# Patient Record
(demographics unavailable — no encounter records)

---

## 2018-01-02 NOTE — PDOC
History of Present Illness





- General


Chief Complaint: Allergic Reaction


Stated Complaint: ALLERGIC REACTION/FEVER


Time Seen by Provider: 01/02/18 01:20





Past History





- Past History


Allergies/Adverse Reactions: 


Allergies





No Known Allergies Allergy (Verified 01/02/18 01:16)


 








Home Medications: 


Ambulatory Orders





Amoxicillin Suspension - 600 mg PO BID #105 ml 01/02/18 











- Social History


Smoking Status: Never smoked





*Physical Exam





- Vital Signs


 Last Vital Signs











Temp Pulse Resp BP Pulse Ox


 


 100.8 F H  109   20   98/67   97 


 


 01/02/18 01:16  01/02/18 01:16  01/02/18 01:16  01/02/18 01:16  01/02/18 01:16














*DC/Admit/Observation/Transfer


Diagnosis at time of Disposition: 


Otitis media


Qualifiers:


 Otitis media type: unspecified Laterality: bilateral Qualified Code(s): H66.93 

- Otitis media, unspecified, bilateral








- Discharge Dispostion


Disposition: HOME


Condition at time of disposition: Good


Admit: No





- Referrals


Referrals: 


John Max MD [Primary Care Provider] - 





- Patient Instructions


Printed Discharge Instructions:  DI for Otitis Media (Middle Ear Infection)-

Child


Additional Instructions: 


Cristobal has an ear infection. He was prescribed amoxicillin. Please take this 

medication twice a day for one week. Please finish all of the medication even 

if he feels better. He may have Tylenol or Motrin as needed for fevers or pain. 

Please drink plenty of fluids. Please follow-up with his pediatrician in 1 week.





Return to the emergency department if he has worsening fevers, nausea, vomiting

, dehydration symptoms, or any changes in his symptoms.





- Post Discharge Activity bi 95